# Patient Record
Sex: MALE | Race: WHITE | Employment: FULL TIME | URBAN - METROPOLITAN AREA
[De-identification: names, ages, dates, MRNs, and addresses within clinical notes are randomized per-mention and may not be internally consistent; named-entity substitution may affect disease eponyms.]

---

## 2020-03-15 ENCOUNTER — COMMUNICATION - HEALTHEAST (OUTPATIENT)
Dept: SCHEDULING | Facility: CLINIC | Age: 30
End: 2020-03-15

## 2021-06-06 NOTE — TELEPHONE ENCOUNTER
"Have not traveled internationally. No/Unsure contact with confirmed or suspected person who has Coronavirus/COVID-19.  Pt has the following symptoms:   Started Friday: fatigue and muscle soreness, neck stiffness, can touch chin to chest  Started Saturday - sore throat, \"feels swollen\", shortness of breath (feels like he has to try take a deeper  Breath, work harder to get a breath out), \"lungs feel heavy\", pressure in the chest, getting worse throughout the day   Pt had 1 coughing spell today (dry cough)  No fever (98.3 to 98.9)  Not aware of any contact with anyone who had the flu  Has had the flu vaccine  Pt reports shortness of breath and \"swollen\" throat as worst symptoms  No hx of heart or lung dz, or chronic conditions.   No runny nose    Reason for Disposition    [1] Probable mild influenza (no fever) or a common cold, with no complications AND [2] NOT HIGH RISK    Protocols used: INFLUENZA - SEASONAL-A-    Care Disposition: Home Care. Care advice given per protocol. Pt verbalizes understanding. Pt will call back if symptoms worsen.     Justyna Isabel RN Triage Nurse Advisor 10:42 PM      "